# Patient Record
Sex: FEMALE | Race: WHITE | Employment: FULL TIME | URBAN - METROPOLITAN AREA
[De-identification: names, ages, dates, MRNs, and addresses within clinical notes are randomized per-mention and may not be internally consistent; named-entity substitution may affect disease eponyms.]

---

## 2020-10-28 ENCOUNTER — MEDICAL CORRESPONDENCE (OUTPATIENT)
Dept: HEALTH INFORMATION MANAGEMENT | Facility: CLINIC | Age: 40
End: 2020-10-28

## 2020-10-28 ENCOUNTER — TRANSFERRED RECORDS (OUTPATIENT)
Dept: HEALTH INFORMATION MANAGEMENT | Facility: CLINIC | Age: 40
End: 2020-10-28

## 2020-10-29 ENCOUNTER — OFFICE VISIT (OUTPATIENT)
Dept: CARDIOLOGY | Facility: CLINIC | Age: 40
End: 2020-10-29
Payer: COMMERCIAL

## 2020-10-29 VITALS
DIASTOLIC BLOOD PRESSURE: 73 MMHG | HEART RATE: 93 BPM | WEIGHT: 129.9 LBS | HEIGHT: 63 IN | BODY MASS INDEX: 23.02 KG/M2 | SYSTOLIC BLOOD PRESSURE: 118 MMHG

## 2020-10-29 DIAGNOSIS — Z13.220 LIPID SCREENING: ICD-10-CM

## 2020-10-29 DIAGNOSIS — I49.3 PVC'S (PREMATURE VENTRICULAR CONTRACTIONS): ICD-10-CM

## 2020-10-29 DIAGNOSIS — R00.2 PALPITATIONS: Primary | ICD-10-CM

## 2020-10-29 PROCEDURE — 99204 OFFICE O/P NEW MOD 45 MIN: CPT | Performed by: INTERNAL MEDICINE

## 2020-10-29 PROCEDURE — 93000 ELECTROCARDIOGRAM COMPLETE: CPT | Performed by: INTERNAL MEDICINE

## 2020-10-29 SDOH — HEALTH STABILITY: MENTAL HEALTH: HOW OFTEN DO YOU HAVE A DRINK CONTAINING ALCOHOL?: NOT ASKED

## 2020-10-29 SDOH — HEALTH STABILITY: MENTAL HEALTH: HOW OFTEN DO YOU HAVE 6 OR MORE DRINKS ON ONE OCCASION?: NOT ASKED

## 2020-10-29 SDOH — HEALTH STABILITY: MENTAL HEALTH: HOW MANY STANDARD DRINKS CONTAINING ALCOHOL DO YOU HAVE ON A TYPICAL DAY?: NOT ASKED

## 2020-10-29 ASSESSMENT — MIFFLIN-ST. JEOR: SCORE: 1228.35

## 2020-10-29 NOTE — PROGRESS NOTES
"CARDIOLOGY CLINIC CONSULT    REASON FOR CONSULT: Evaluate for arrhythmia.  Referred by Theodora TRIPP    PRIMARY CARE PHYSICIAN:  Physician No Ref-Primary    HISTORY OF PRESENT ILLNESS:  41 yo woman here on referral from her GYN/women's primary care MD for eval of palpitations. For most of her life she has occasionally noticed sensation of heart skipping a beat or beating hard. It has been happening for years, but feels like it's nonstop over the past week. She will note one \"hard\" beat and a few times she has noticed a few hard beats in a row. No dizzinss, lightheadness, no chest pain or abnormal dyspnea. Not associated with activity or time of day.  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  She has been doing a keto diet over the past ~week.    She does not exercise but is active with work and her 2 kids.    PAST MEDICAL HISTORY:  History reviewed. No pertinent past medical history.    MEDICATIONS:  Current Outpatient Medications   Medication     acetaminophen-caffeine (EXCEDRIN TENSION HEADACHE) 500-65 MG TABS     No current facility-administered medications for this visit.        ALLERGIES:  No Known Allergies    SOCIAL HISTORY:  I have reviewed this patient's social history and updated it with pertinent information if needed. Anuj Valadez  reports that she has never smoked. She has never used smokeless tobacco. She reports current alcohol use.   No heavy alcohol, maybe 1-2 drinks per week.    FAMILY HISTORY:  No known family history of arrhythmia    REVIEW OF SYSTEMS:  Skin:  Negative     Eyes:  Positive for    ENT:  Negative    Respiratory:  Negative    Cardiovascular:  Negative;palpitations;dizziness;lightheadedness;syncope or near-syncope;cyanosis;exercise intolerance;fatigue Positive for;palpitations  Gastroenterology: Negative    Genitourinary:  Negative    Musculoskeletal:  Negative    Neurologic:  Positive for headaches  Psychiatric:  Negative  "   Heme/Lymph/Imm:  Positive for easy bruising  Endocrine:  Negative      PHYSICAL EXAM:      BP: 118/73 Pulse: 93            Vital Signs with Ranges  Pulse:  [93] 93  BP: (118)/(73) 118/73  129 lbs 14.4 oz    Constitutional: awake, alert, no distress  Eyes: sclera nonicteric  ENT: trachea midline  Respiratory: clear bilat  Cardiovascular: regular rate and rhythm no  Murmur, rub, or gallop  GI: nondistended, nontender, bowel sounds present  Lymph/Hematologic: no lymphadenopathy  Skin: dry, no rash no edema  Musculoskeletal: grossly normal muscle bulk and tone  Neurologic: no focal deficits  Neuropsychiatric: appropriate affect      DATA:   Reviewed labs from [facility]  Normal Hgb and TSH    EKG sinus with borderline short MA (118msec), nonspecific rsr' qrs 98msec suggestive of mild right ventricular conduction abnormality and a single PVC    Echo no previous echoNo results found.    Coronarary angiogram: none      ASSESSMENT:  1. Palpitations, likely PVCs. No sustained symptoms, no presyncope, syncope, or angina. No signs or symptoms of CHF.    2. Mild conduction abnormalities with MA 118msec and RSR'.     We discussed PVCs.  Often PVCs can be cyclical and if EF is normal and overall burden is mild without sustained arrhythmia this can be monitored/treated conservatively.  Typically first-line tx is a BB, but her symptoms are mild and she has normal BP, so will not initiate medical rx unless high PVC burden/worsening symptomsgf are present.     RECOMMENDATIONS:  1. Echo to eval for structural heart abnormality  2. 48 hour Holter monitor.  3. CMP and screening lipids (already had Hgb and TSH checked which were normal)  4. Stop keto diet  5. Abstain from alcohol for now/until workup complete  6. Will schedule cardiology MARIANNE follow up to discuss test results.      Yeni Cantrell MD [facility]  Text Page

## 2020-10-29 NOTE — PATIENT INSTRUCTIONS
1.  Schedule an echo and a Holter.  2.  Get labs drawn.  3.  Schedule follow up after tests are complete.

## 2020-10-29 NOTE — LETTER
"10/29/2020    Physician No Ref-Primary  No address on file    RE: Aunj Valadez       Dear Colleague,    I had the pleasure of seeing Anuj Valadez in the [facility].    CARDIOLOGY CLINIC CONSULT    REASON FOR CONSULT: Evaluate for arrhythmia.  Referred by Theodora TRIPP    PRIMARY CARE PHYSICIAN:  Physician No Ref-Primary    HISTORY OF PRESENT ILLNESS:  39 yo woman here on referral from her GYN/women's primary care MD for eval of palpitations. For most of her life she has occasionally noticed sensation of heart skipping a beat or beating hard. It has been happening for years, but feels like it's nonstop over the past week. She will note one \"hard\" beat and a few times she has noticed a few hard beats in a row. No dizzinss, lightheadness, no chest pain or abnormal dyspnea. Not associated with activity or time of day.  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  She has been doing a keto diet over the past ~week.    She does not exercise but is active with work and her 2 kids.    PAST MEDICAL HISTORY:  History reviewed. No pertinent past medical history.    MEDICATIONS:  Current Outpatient Medications   Medication     acetaminophen-caffeine (EXCEDRIN TENSION HEADACHE) 500-65 MG TABS     No current facility-administered medications for this visit.        ALLERGIES:  No Known Allergies    SOCIAL HISTORY:  I have reviewed this patient's social history and updated it with pertinent information if needed. Anuj Valadez  reports that she has never smoked. She has never used smokeless tobacco. She reports current alcohol use.   No heavy alcohol, maybe 1-2 drinks per week.    FAMILY HISTORY:  No known family history of arrhythmia    REVIEW OF SYSTEMS:  Skin:  Negative     Eyes:  Positive for    ENT:  Negative    Respiratory:  Negative    Cardiovascular:  Negative;palpitations;dizziness;lightheadedness;syncope or near-syncope;cyanosis;exercise " intolerance;fatigue Positive for;palpitations  Gastroenterology: Negative    Genitourinary:  Negative    Musculoskeletal:  Negative    Neurologic:  Positive for headaches  Psychiatric:  Negative    Heme/Lymph/Imm:  Positive for easy bruising  Endocrine:  Negative      PHYSICAL EXAM:      BP: 118/73 Pulse: 93            Vital Signs with Ranges  Pulse:  [93] 93  BP: (118)/(73) 118/73  129 lbs 14.4 oz    Constitutional: awake, alert, no distress  Eyes: sclera nonicteric  ENT: trachea midline  Respiratory: clear bilat  Cardiovascular: regular rate and rhythm no  Murmur, rub, or gallop  GI: nondistended, nontender, bowel sounds present  Lymph/Hematologic: no lymphadenopathy  Skin: dry, no rash no edema  Musculoskeletal: grossly normal muscle bulk and tone  Neurologic: no focal deficits  Neuropsychiatric: appropriate affect      DATA:   Reviewed labs from [facility]  Normal Hgb and TSH    EKG sinus with borderline short KS (118msec), nonspecific rsr' qrs 98msec suggestive of mild right ventricular conduction abnormality and a single PVC    Echo no previous echoNo results found.    Coronarary angiogram: none      ASSESSMENT:  1. Palpitations, likely PVCs. No sustained symptoms, no presyncope, syncope, or angina. No signs or symptoms of CHF.    2. Mild conduction abnormalities with KS 118msec and RSR'.     We discussed PVCs.  Often PVCs can be cyclical and if EF is normal and overall burden is mild without sustained arrhythmia this can be monitored/treated conservatively.  Typically first-line tx is a BB, but her symptoms are mild and she has normal BP, so will not initiate medical rx unless high PVC burden/worsening symptomsgf are present.     RECOMMENDATIONS:  1. Echo to eval for structural heart abnormality  2. 48 hour Holter monitor.  3. CMP and screening lipids (already had Hgb and TSH checked which were normal)  4. Stop keto diet  5. Abstain from alcohol for now/until workup complete  6. Will schedule cardiology  MARIANNE follow up to discuss test results.      Yeni Cantrell MD [facility]  Text Page           Thank you for allowing me to participate in the care of your patient.      Sincerely,     Yeni Cantrell MD     [facility]    cc:   No referring provider defined for this encounter.

## 2020-11-02 ENCOUNTER — HOSPITAL ENCOUNTER (OUTPATIENT)
Dept: CARDIOLOGY | Facility: CLINIC | Age: 40
Discharge: HOME | End: 2020-11-02
Attending: INTERNAL MEDICINE | Admitting: INTERNAL MEDICINE
Payer: COMMERCIAL

## 2020-11-02 DIAGNOSIS — R00.2 PALPITATIONS: ICD-10-CM

## 2020-11-02 DIAGNOSIS — I49.3 PVC'S (PREMATURE VENTRICULAR CONTRACTIONS): ICD-10-CM

## 2020-11-02 PROCEDURE — 93225 XTRNL ECG REC<48 HRS REC: CPT

## 2020-11-02 PROCEDURE — 93227 XTRNL ECG REC<48 HR R&I: CPT | Performed by: INTERNAL MEDICINE

## 2020-11-05 ENCOUNTER — HOSPITAL ENCOUNTER (OUTPATIENT)
Dept: CARDIOLOGY | Facility: CLINIC | Age: 40
Discharge: HOME | End: 2020-11-05
Attending: INTERNAL MEDICINE | Admitting: INTERNAL MEDICINE
Payer: COMMERCIAL

## 2020-11-05 DIAGNOSIS — I49.3 PVC'S (PREMATURE VENTRICULAR CONTRACTIONS): ICD-10-CM

## 2020-11-05 DIAGNOSIS — R00.2 PALPITATIONS: ICD-10-CM

## 2020-11-05 DIAGNOSIS — Z13.220 LIPID SCREENING: ICD-10-CM

## 2020-11-05 LAB
ALBUMIN SERPL-MCNC: 3.7 G/DL (ref 3.4–5)
ALP SERPL-CCNC: 46 U/L (ref 40–150)
ALT SERPL W P-5'-P-CCNC: 13 U/L (ref 0–50)
ANION GAP SERPL CALCULATED.3IONS-SCNC: 8 MMOL/L (ref 3–14)
AST SERPL W P-5'-P-CCNC: 10 U/L (ref 0–45)
BILIRUB SERPL-MCNC: 0.5 MG/DL (ref 0.2–1.3)
BUN SERPL-MCNC: 10 MG/DL (ref 7–30)
CALCIUM SERPL-MCNC: 8.5 MG/DL (ref 8.5–10.1)
CHLORIDE SERPL-SCNC: 104 MMOL/L (ref 94–109)
CHOLEST SERPL-MCNC: 186 MG/DL
CO2 SERPL-SCNC: 26 MMOL/L (ref 20–32)
CREAT SERPL-MCNC: 0.81 MG/DL (ref 0.52–1.04)
GFR SERPL CREATININE-BSD FRML MDRD: >90 ML/MIN/{1.73_M2}
GLUCOSE SERPL-MCNC: 84 MG/DL (ref 70–99)
HDLC SERPL-MCNC: 66 MG/DL
LDLC SERPL CALC-MCNC: 106 MG/DL
NONHDLC SERPL-MCNC: 120 MG/DL
POTASSIUM SERPL-SCNC: 3.9 MMOL/L (ref 3.4–5.3)
PROT SERPL-MCNC: 7.1 G/DL (ref 6.8–8.8)
SODIUM SERPL-SCNC: 138 MMOL/L (ref 133–144)
TRIGL SERPL-MCNC: 72 MG/DL

## 2020-11-05 PROCEDURE — 93306 TTE W/DOPPLER COMPLETE: CPT

## 2020-11-05 PROCEDURE — 80061 LIPID PANEL: CPT | Performed by: INTERNAL MEDICINE

## 2020-11-05 PROCEDURE — 93306 TTE W/DOPPLER COMPLETE: CPT | Mod: 26 | Performed by: INTERNAL MEDICINE

## 2020-11-05 PROCEDURE — 80053 COMPREHEN METABOLIC PANEL: CPT | Performed by: INTERNAL MEDICINE

## 2020-11-05 PROCEDURE — 36415 COLL VENOUS BLD VENIPUNCTURE: CPT | Performed by: INTERNAL MEDICINE

## 2020-11-05 NOTE — RESULT ENCOUNTER NOTE
Please mail the patient a copy of her normal echo result.  Thank you,  Yeni Cantrell MD on 11/5/2020 at 3:55 PM

## 2020-11-09 ENCOUNTER — VIRTUAL VISIT (OUTPATIENT)
Dept: CARDIOLOGY | Facility: CLINIC | Age: 40
End: 2020-11-09
Attending: INTERNAL MEDICINE
Payer: COMMERCIAL

## 2020-11-09 DIAGNOSIS — R00.2 PALPITATIONS: ICD-10-CM

## 2020-11-09 DIAGNOSIS — I49.3 PVC'S (PREMATURE VENTRICULAR CONTRACTIONS): ICD-10-CM

## 2020-11-09 PROCEDURE — 99213 OFFICE O/P EST LOW 20 MIN: CPT | Mod: 95 | Performed by: PHYSICIAN ASSISTANT

## 2020-11-09 NOTE — PROGRESS NOTES
"Anuj Valadez is a 40 year old female who is being evaluated via a billable video visit.      The patient has been notified of following:     \"This video visit will be conducted via a call between you and your physician/provider. We have found that certain health care needs can be provided without the need for an in-person physical exam.  This service lets us provide the care you need with a video conversation.  If a prescription is necessary we can send it directly to your pharmacy.  If lab work is needed we can place an order for that and you can then stop by our lab to have the test done at a later time.    Video visits are billed at different rates depending on your insurance coverage.  Please reach out to your insurance provider with any questions.    If during the course of the call the physician/provider feels a video visit is not appropriate, you will not be charged for this service.\"    Patient has given verbal consent for Video visit? Yes  How would you like to obtain your AVS? Mail a copy  If you are dropped from the video visit, the video invite should be resent to: Text to cell phone: [phone]  Will anyone else be joining your video visit? No     Video-Visit Details    Type of service:  Video Visit    Video Start Time: 3:07  Video End Time: 3:17 PM    Originating Location (pt. Location): Home    Distant Location (provider location):  [facility]     Platform used for Video Visit: Doximity      Patient reported vitals:  BP:not taken  Heart rate:  Weight:120    Review Of Systems  Skin: negative  Eyes: negative  Ears/Nose/Throat: negative  Respiratory: No shortness of breath, dyspnea on exertion, cough, or hemoptysis  Cardiovascular: palpitations  Gastrointestinal: negative  Genitourinary: negative  Musculoskeletal: negative  Neurologic: negative  Psychiatric: negative  Hematologic/Lymphatic/Immunologic: negative  Endocrine: negative    SAM Montoya    Service " Date: 11/9/2020      REASON FOR VISIT:  Anuj Valadez is a pleasant 40 year old female who is being evaluated via a billable video visit in order to minimize the possibility of exposure due to the current COVID-19 pandemic. This is a palpitations/post testing follow up.     She has no prior cardiac hx. She was recently referred from her GYN/women's primary care MD for eval of palpitations. For most of her life she has occasionally noticed sensation of heart skipping a beat or beating hard. It has been happening for years, but her symptoms increased recently. She saw Dr Cantrell in consultation and an echo, Holter monitor and CMP and screening lipids were ordered. (She already had Hgb and TSH checked which were normal).    No new concerns today. Unfortunately her  test positive for COVID 2 days ago.    We reviewed her cardiac testing results. Her echo is normal. CMP was normal. Lipids showed total cholesterol 186, HDL 66,  and TG 72. 10-year ASCVD risk 0.3%. 48 Hour Holter showed SR with rare PACs and occasional PVCs. Overall PVC burden is low at <1% of total heartbeats. Symptoms seemed to correlate often with PVCs.         CURRENT MEDICATIONS:   Current Outpatient Medications   Medication Sig Dispense Refill     acetaminophen-caffeine (EXCEDRIN TENSION HEADACHE) 500-65 MG TABS Take 2 tablets by mouth every 6 hours as needed for mild pain         PHYSICAL EXAMINATION:   GENERAL: Healthy, alert and no distress  EYES: Eyes grossly normal to inspection.    RESP: No audible wheeze, cough, or visible cyanosis.  No visible retractions or increased work of breathing.    SKIN: Visible skin clear. No significant rash, abnormal pigmentation or lesions.  NEURO: Mentation and speech appropriate for age.  PSYCH: Normal, affect normal/bright, judgement and insight intact.     ASSESSMENT AND PLAN:  Anuj Valadez is a pleasant 40 year old female with a history of palpitations which have been more frequent as  of late. Her work up shows her symptoms are likely related to PVCs. The PVC burden was <1% on her recent Holter monitor. Echo showed a structurally normal heart. All her recent blood work has not shown any significant abnormalities. We discussed the benign nature of the PVCs and reassurance was given, she was pleased with this. We did briefly discuss symptom control with a BB, but she declines at this time.     Follow up on an as needed basis.    An AVS will be provided to the patient.      Thank you for allowing me to participate in the care of this pleasant patient.  Please do not hesitate to contact us with further questions or concerns.         AMANDA MARTÍNEZ PA-C

## 2020-11-09 NOTE — LETTER
"11/9/2020    Physician No Ref-Primary  No address on file    RE: Anuj Valadez       Dear Colleague,    I had the pleasure of seeing Anuj Valadez in the [facility].    Anuj Valadez is a 40 year old female who is being evaluated via a billable video visit.      The patient has been notified of following:     \"This video visit will be conducted via a call between you and your physician/provider. We have found that certain health care needs can be provided without the need for an in-person physical exam.  This service lets us provide the care you need with a video conversation.  If a prescription is necessary we can send it directly to your pharmacy.  If lab work is needed we can place an order for that and you can then stop by our lab to have the test done at a later time.    Video visits are billed at different rates depending on your insurance coverage.  Please reach out to your insurance provider with any questions.    If during the course of the call the physician/provider feels a video visit is not appropriate, you will not be charged for this service.\"    Patient has given verbal consent for Video visit? Yes  How would you like to obtain your AVS? Mail a copy  If you are dropped from the video visit, the video invite should be resent to: Text to cell phone: [phone]  Will anyone else be joining your video visit? No     Video-Visit Details    Type of service:  Video Visit    Video Start Time: 3:07  Video End Time: 3:17 PM    Originating Location (pt. Location): Home    Distant Location (provider location):  [facility]     Platform used for Video Visit: Doximity      Patient reported vitals:  BP:not taken  Heart rate:  Weight:120    Review Of Systems  Skin: negative  Eyes: negative  Ears/Nose/Throat: negative  Respiratory: No shortness of breath, dyspnea on exertion, cough, or hemoptysis  Cardiovascular: " palpitations  Gastrointestinal: negative  Genitourinary: negative  Musculoskeletal: negative  Neurologic: negative  Psychiatric: negative  Hematologic/Lymphatic/Immunologic: negative  Endocrine: negative    SAM Montoya    Service Date: 11/9/2020      REASON FOR VISIT:  Anuj Valadez is a pleasant 40 year old female who is being evaluated via a billable video visit in order to minimize the possibility of exposure due to the current COVID-19 pandemic. This is a palpitations/post testing follow up.     She has no prior cardiac hx. She was recently referred from her GYN/women's primary care MD for eval of palpitations. For most of her life she has occasionally noticed sensation of heart skipping a beat or beating hard. It has been happening for years, but her symptoms increased recently. She saw Dr Cantrell in consultation and an echo, Holter monitor and CMP and screening lipids were ordered. (She already had Hgb and TSH checked which were normal).    No new concerns today. Unfortunately her  test positive for COVID 2 days ago.    We reviewed her cardiac testing results. Her echo is normal. CMP was normal. Lipids showed total cholesterol 186, HDL 66,  and TG 72. 10-year ASCVD risk 0.3%. 48 Hour Holter showed SR with rare PACs and occasional PVCs. Overall PVC burden is low at <1% of total heartbeats. Symptoms seemed to correlate often with PVCs.         CURRENT MEDICATIONS:   Current Outpatient Medications   Medication Sig Dispense Refill     acetaminophen-caffeine (EXCEDRIN TENSION HEADACHE) 500-65 MG TABS Take 2 tablets by mouth every 6 hours as needed for mild pain         PHYSICAL EXAMINATION:   GENERAL: Healthy, alert and no distress  EYES: Eyes grossly normal to inspection.    RESP: No audible wheeze, cough, or visible cyanosis.  No visible retractions or increased work of breathing.    SKIN: Visible skin clear. No significant rash, abnormal pigmentation or lesions.  NEURO: Mentation and speech  appropriate for age.  PSYCH: Normal, affect normal/bright, judgement and insight intact.     ASSESSMENT AND PLAN:  Anuj Valadez is a pleasant 40 year old female with a history of palpitations which have been more frequent as of late. Her work up shows her symptoms are likely related to PVCs. The PVC burden was <1% on her recent Holter monitor. Echo showed a structurally normal heart. All her recent blood work has not shown any significant abnormalities. We discussed the benign nature of the PVCs and reassurance was given, she was pleased with this. We did briefly discuss symptom control with a BB, but she declines at this time.     Follow up on an as needed basis.    An AVS will be provided to the patient.      Thank you for allowing me to participate in the care of this pleasant patient.  Please do not hesitate to contact us with further questions or concerns.         FERN MARTÍNEZ PA-C          Thank you for allowing me to participate in the care of your patient.      Sincerely,     Fern Martínez PA-C     [facility]    cc:   Yeni Cantrell MD  [id number] [id number]

## 2023-04-11 ENCOUNTER — LAB REQUISITION (OUTPATIENT)
Dept: LAB | Facility: CLINIC | Age: 43
End: 2023-04-11

## 2023-04-11 DIAGNOSIS — Z13.220 ENCOUNTER FOR SCREENING FOR LIPOID DISORDERS: ICD-10-CM

## 2023-04-11 DIAGNOSIS — Z13.1 ENCOUNTER FOR SCREENING FOR DIABETES MELLITUS: ICD-10-CM

## 2023-04-11 LAB
CHOLEST SERPL-MCNC: 215 MG/DL
FASTING STATUS PATIENT QL REPORTED: YES
GLUCOSE SERPL-MCNC: 87 MG/DL (ref 70–99)
HBA1C MFR BLD: 5 %
HDLC SERPL-MCNC: 72 MG/DL
LDLC SERPL CALC-MCNC: 131 MG/DL
NONHDLC SERPL-MCNC: 143 MG/DL
TRIGL SERPL-MCNC: 61 MG/DL

## 2023-04-11 PROCEDURE — 83036 HEMOGLOBIN GLYCOSYLATED A1C: CPT | Performed by: NURSE PRACTITIONER

## 2023-04-11 PROCEDURE — 82947 ASSAY GLUCOSE BLOOD QUANT: CPT | Performed by: NURSE PRACTITIONER

## 2023-04-11 PROCEDURE — 80061 LIPID PANEL: CPT | Performed by: NURSE PRACTITIONER

## 2025-08-07 ENCOUNTER — LAB REQUISITION (OUTPATIENT)
Dept: LAB | Facility: CLINIC | Age: 45
End: 2025-08-07
Payer: COMMERCIAL

## 2025-08-07 DIAGNOSIS — Z12.4 ENCOUNTER FOR SCREENING FOR MALIGNANT NEOPLASM OF CERVIX: ICD-10-CM

## 2025-08-07 PROCEDURE — G0145 SCR C/V CYTO,THINLAYER,RESCR: HCPCS | Mod: ORL | Performed by: ADVANCED PRACTICE MIDWIFE

## 2025-08-07 PROCEDURE — 87624 HPV HI-RISK TYP POOLED RSLT: CPT | Mod: ORL | Performed by: ADVANCED PRACTICE MIDWIFE

## 2025-08-08 LAB
HPV HR 12 DNA CVX QL NAA+PROBE: NEGATIVE
HPV16 DNA CVX QL NAA+PROBE: NEGATIVE
HPV18 DNA CVX QL NAA+PROBE: NEGATIVE
HUMAN PAPILLOMA VIRUS FINAL DIAGNOSIS: NORMAL

## 2025-08-13 LAB
BKR AP ASSOCIATED HPV REPORT: NORMAL
BKR LAB AP GYN ADEQUACY: NORMAL
BKR LAB AP GYN INTERPRETATION: NORMAL
BKR LAB AP LMP: NORMAL
BKR LAB AP PREVIOUS ABNL DX: NORMAL
BKR LAB AP PREVIOUS ABNORMAL: NORMAL
PATH REPORT.COMMENTS IMP SPEC: NORMAL
PATH REPORT.COMMENTS IMP SPEC: NORMAL
PATH REPORT.RELEVANT HX SPEC: NORMAL